# Patient Record
(demographics unavailable — no encounter records)

---

## 2024-12-13 NOTE — VITALS
[US Date: ___] : ultrasound performed on [unfilled]. [GA= ___ Weeks] : Results were GA of [unfilled] weeks [GA= ___ Days] : and [unfilled] day(s) [LEELEE by US (date): ___] : The calculated LEELEE by US is [unfilled]

## 2024-12-13 NOTE — OB HISTORY
[Pregnancy History] : patient received anesthesia [___] : Delivery occurred at [unfilled] [Spontaneous] : Spontaneous conception [Sonogram] : sonogram [at ___ wks] : at [unfilled] weeks [FreeTextEntry1] : History obtained by  using [facility]  #[id number]. [facility] to discuss AMA, complete previa and history of PTD. Pt is AMA 39y/o and had NIPS which was LR and AFP negative. Pt had sono on  and complete previa was seen and pt on pelvic rest. Bloodwork from  showed elevated amylase?. Pt reports her  pregnancy she was put into coma due to COVID/ respiratory failure and had emergency  at 34 weeks. Pt reports good FM. Pt denies ctx/cramping/ vag. bleeding/leaking.

## 2024-12-13 NOTE — DISCUSSION/SUMMARY
[FreeTextEntry1] : We had the pleasure of seeing your patient for a Maternal-Fetal Medicine consultation today. She was extensively counseled regarding the following issues:  Previous pregnancy complicated by critical illness (respiratory failure) from COVID-19 resulting in 34-week  birth by  section and prolonged intubation (~2 weeks): I explained to the patient that this obstetrical history does not necessarily increase her risks substantially in the current pregnancy. COVID vaccination is recommended to all pregnant patients.  Kelly has now had 3 vaginal deliveries followed by 1  section. Discussed risks (uterine rupture, hemorrhage), benefits (avoiding major abdominal surgery), and alternatives (TOLAC vs. repeat ) of delivery options for her pregnancy given prior  delivery. Patient understands risks and benefits and will follow up to discuss preferred delivery plan. Of note, she currently has a placenta previa (see below).  Placenta Previa: I counseled the patient regarding today's ultrasound findings. Based on the sonographic appearance today, it is very possible that the placenta will move away from the internal cervical os. Bleeding precautions and pelvic rest were reviewed. If placenta previa is confirmed in the third trimester,  delivery is recommended at 37 weeks. The patient understands that delivery may be indicated earlier in the event of significant vaginal bleeding. Follow-up ultrasound scheduled at 28 weeks.  Advanced Maternal Age (AMA): Women who are of advanced maternal age (typically defined as age 35 at delivery) are at an increased risk for fetal aneuploidy, spontaneous , congenital malformations, diabetes, hypertensive disorders of pregnancy,  delivery, stillbirth, and low birth weight neonates. Genetic counseling is available, if desired, to review and discuss invasive and non-invasive options to detect aneuploidy. These options include: first trimester risk-assessment, sequential screening, non-invasive prenatal screening (NIPS), amniocentesis, and anatomical ultrasound at 20 weeks. NIPS low risk.  Obesity, class 1: Obesity is associated with an increased risk for pregnancy complications such as preeclampsia. Complications from surgery are increased, as well as failure of regional anesthesia. In addition, the fetus is at increased risk for congenital anomalies, most commonly neural tube defects and cardiac anomalies, even in the absence of diabetes.  Malformations are more difficult to assess by ultrasound evaluation due to the decreased sensitivity of ultrasound in women with a high BMI. During pregnancy, optimum weight gain recommended by the [employer] 2009 Guidelines is 11-20 pounds. Furthermore, pregnant women with obesity are at a greater risk for venous thromboembolism. Weekly fetal testing recommended at 36 weeks.   Thank you for requesting a consultation on this patient. The total time spent in preparation for this visit, medical history taking, orders, review of records, counseling the patient, and writing this note was 60 minutes.  At the end of our discussion, the patient indicated that her questions were answered and she seemed satisfied with our discussion. Please do not hesitate to contact us with any questions.  Sincerely,   Tonio Roberto MD, [facility] Attending Physician, Maternal-Fetal Medicine

## 2024-12-13 NOTE — FAMILY HISTORY
[Reported Family History Of Birth Defects] : no congenital heart defects [Arnoldo-Sachs Carrier] : no Arnoldo-Sachs [Family History] : no mental retardation/autism [Reported Family History Of Genetic Disease] : no history of child defect in child of baby father

## 2025-01-16 NOTE — DATA REVIEWED
[FreeTextEntry1] : Sonogram today shows good interval growth with the overall size at the 38th percentile. Additionally, the placenta remains a central previa and Kelly was counseled about this and the potential need for delivery early by   Review of home glucose shows excellent glucose control of fasting and post prandial values.  She has an occasional mild elevation that seems to be due to a poor food choice, but Kelly has a good understanding of the diet. She will continue to monitor her glucose at home and is scheduled for a followup with the dietician

## 2025-01-16 NOTE — DISCUSSION/SUMMARY
[FreeTextEntry1] : Repeat growth scan in 4 weeks. Will reevaluate placental location at that time.   Delivery at 363-7 weeks if placenta previa persists.   Followup with medical nutrition. Continue testing glucose 4X daily